# Patient Record
Sex: MALE | Race: WHITE
[De-identification: names, ages, dates, MRNs, and addresses within clinical notes are randomized per-mention and may not be internally consistent; named-entity substitution may affect disease eponyms.]

---

## 2018-04-29 ENCOUNTER — HOSPITAL ENCOUNTER (OUTPATIENT)
Dept: HOSPITAL 97 - ER | Age: 78
Setting detail: OBSERVATION
LOS: 1 days | Discharge: HOME | End: 2018-04-30
Attending: FAMILY MEDICINE | Admitting: FAMILY MEDICINE
Payer: COMMERCIAL

## 2018-04-29 VITALS — BODY MASS INDEX: 29.3 KG/M2

## 2018-04-29 DIAGNOSIS — Z86.73: ICD-10-CM

## 2018-04-29 DIAGNOSIS — I38: ICD-10-CM

## 2018-04-29 DIAGNOSIS — G62.9: ICD-10-CM

## 2018-04-29 DIAGNOSIS — D69.6: ICD-10-CM

## 2018-04-29 DIAGNOSIS — K92.1: ICD-10-CM

## 2018-04-29 DIAGNOSIS — G93.41: Primary | ICD-10-CM

## 2018-04-29 DIAGNOSIS — I25.10: ICD-10-CM

## 2018-04-29 DIAGNOSIS — Z95.0: ICD-10-CM

## 2018-04-29 DIAGNOSIS — R73.9: ICD-10-CM

## 2018-04-29 DIAGNOSIS — E78.5: ICD-10-CM

## 2018-04-29 DIAGNOSIS — I10: ICD-10-CM

## 2018-04-29 DIAGNOSIS — I49.5: ICD-10-CM

## 2018-04-29 LAB
ALBUMIN SERPL BCP-MCNC: 4.2 G/DL (ref 3.2–5.5)
ALP SERPL-CCNC: 37 IU/L (ref 42–121)
ALT SERPL W P-5'-P-CCNC: 24 IU/L (ref 10–60)
AST SERPL W P-5'-P-CCNC: 30 IU/L (ref 10–42)
BUN BLD-MCNC: 22 MG/DL (ref 6–20)
GLUCOSE SERPLBLD-MCNC: 140 MG/DL (ref 65–120)
HCT VFR BLD CALC: 41.6 % (ref 39.6–49)
INR BLD: 0.99
LIPASE SERPL-CCNC: 21 U/L (ref 22–51)
LYMPHOCYTES # SPEC AUTO: 1.1 K/UL (ref 0.7–4.9)
MAGNESIUM SERPL-MCNC: 2.1 MG/DL (ref 1.8–2.5)
MCH RBC QN AUTO: 29.8 PG (ref 27–35)
MCV RBC: 90.5 FL (ref 80–100)
METHAMPHET UR QL SCN: NEGATIVE
PMV BLD: 8.6 FL (ref 7.6–11.3)
POTASSIUM SERPL-SCNC: 4.4 MEQ/L (ref 3.6–5)
RBC # BLD: 4.6 M/UL (ref 4.33–5.43)
THC SERPL-MCNC: NEGATIVE NG/ML

## 2018-04-29 PROCEDURE — 80307 DRUG TEST PRSMV CHEM ANLYZR: CPT

## 2018-04-29 PROCEDURE — 93005 ELECTROCARDIOGRAM TRACING: CPT

## 2018-04-29 PROCEDURE — 81003 URINALYSIS AUTO W/O SCOPE: CPT

## 2018-04-29 PROCEDURE — 97163 PT EVAL HIGH COMPLEX 45 MIN: CPT

## 2018-04-29 PROCEDURE — 71045 X-RAY EXAM CHEST 1 VIEW: CPT

## 2018-04-29 PROCEDURE — 85025 COMPLETE CBC W/AUTO DIFF WBC: CPT

## 2018-04-29 PROCEDURE — 85610 PROTHROMBIN TIME: CPT

## 2018-04-29 PROCEDURE — 87804 INFLUENZA ASSAY W/OPTIC: CPT

## 2018-04-29 PROCEDURE — 36415 COLL VENOUS BLD VENIPUNCTURE: CPT

## 2018-04-29 PROCEDURE — 84484 ASSAY OF TROPONIN QUANT: CPT

## 2018-04-29 PROCEDURE — 83735 ASSAY OF MAGNESIUM: CPT

## 2018-04-29 PROCEDURE — 80048 BASIC METABOLIC PNL TOTAL CA: CPT

## 2018-04-29 PROCEDURE — 80053 COMPREHEN METABOLIC PANEL: CPT

## 2018-04-29 PROCEDURE — 80076 HEPATIC FUNCTION PANEL: CPT

## 2018-04-29 PROCEDURE — 70450 CT HEAD/BRAIN W/O DYE: CPT

## 2018-04-29 PROCEDURE — 94760 N-INVAS EAR/PLS OXIMETRY 1: CPT

## 2018-04-29 PROCEDURE — 82962 GLUCOSE BLOOD TEST: CPT

## 2018-04-29 PROCEDURE — 82746 ASSAY OF FOLIC ACID SERUM: CPT

## 2018-04-29 PROCEDURE — 82550 ASSAY OF CK (CPK): CPT

## 2018-04-29 PROCEDURE — 82140 ASSAY OF AMMONIA: CPT

## 2018-04-29 PROCEDURE — 90670 PCV13 VACCINE IM: CPT

## 2018-04-29 PROCEDURE — 99285 EMERGENCY DEPT VISIT HI MDM: CPT

## 2018-04-29 PROCEDURE — 83880 ASSAY OF NATRIURETIC PEPTIDE: CPT

## 2018-04-29 PROCEDURE — 74177 CT ABD & PELVIS W/CONTRAST: CPT

## 2018-04-29 PROCEDURE — 83690 ASSAY OF LIPASE: CPT

## 2018-04-29 PROCEDURE — 82607 VITAMIN B-12: CPT

## 2018-04-29 PROCEDURE — 86592 SYPHILIS TEST NON-TREP QUAL: CPT

## 2018-04-29 RX ADMIN — SODIUM CHLORIDE SCH MLS: 0.9 INJECTION, SOLUTION INTRAVENOUS at 22:33

## 2018-04-29 RX ADMIN — Medication SCH ML: at 22:32

## 2018-04-29 NOTE — RAD REPORT
EXAM DESCRIPTION:  CT - Head Brain Wo Cont - 4/29/2018 3:36 pm

 

CLINICAL HISTORY:  Transient alteration of awareness

 

COMPARISON:  None.

 

TECHNIQUE:  Axial 5 mm thick images of the head were obtained without IV contrast.

 

All CT scans are performed using dose optimization technique as appropriate and may include automated
 exposure control or mA/KV adjustment according to patient size.

 

FINDINGS:  No intracranial hemorrhage, mass, edema or shift of mid-line structures. No acute cortical
 based infarction. Minimal atrophy and chronic ischemic changes are noted. No abnormal extra-axial fl
uid collections. Ventricles are normal. Contrast is present in the arteries and venous sinuses from e
arlier contrast CT abdomen study.

 

Mastoid air cells and visualized portions of the paranasal sinuses are clear.

 

No acute bony findings.

 

 

IMPRESSION:  Negative non-contrast CT head examination for acute finding.  Minimal atrophy and chroni
c ischemic change noted.

## 2018-04-29 NOTE — ER
Nurse's Notes                                                                                     

 [facility]                                                                

Name: Jason Lal Sr                                                                             

Age: 77 yrs                                                                                       

Sex: Male                                                                                         

: 1940                                                                                   

MRN: [id number]                                                                                   

Arrival Date: 2018                                                                          

Time: 12:17                                                                                       

Account#: [id number]                                                                            

Bed 6                                                                                             

Private MD:                                                                                       

Diagnosis: Altered mental status, unspecified;Dehydration                                         

                                                                                                  

Presentation:                                                                                     

                                                                                             

12:18 Presenting complaint: EMS states: PT C/O abd pain, nausea, vomiting, not feeling right  la1 

      in head, BP was elevated in the 190s, no focal neurological deficits noted but pt seems     

      lethargic. Transition of care: patient was not received from another setting of care.       

      Onset of symptoms was 2018. Initial Sepsis Screen: Does the patient meet any      

      2 criteria? No. Patient's initial sepsis screen is negative. Does the patient have a        

      suspected source of infection? No. Patient's initial sepsis screen is negative. Care        

      prior to arrival: None.                                                                     

12:18 Method Of Arrival: EMS: [facility]                                                    la1 

12:18 Acuity: VASQUEZ 2                                                                           la1 

                                                                                                  

Historical:                                                                                       

- Allergies:                                                                                      

12:20 No Known Allergies;                                                                     la1 

- Home Meds:                                                                                      

17:46 metoprolol tartrate 50 mg Oral tab 1 tab once daily [Active]; aspirin 325 mg Oral TbEC  la1 

      1 tab once daily [Active]; Fish Oil 1,000 mg Oral cap 2 cap daily [Active];                 

      losartan-hydrochlorothiazide 100-12.5 mg Oral tab 1 tab once daily [Active]; Plavix 75      

      mg Oral tab 1 tab once daily [Active]; Ranexa 500 mg Oral Tb12 1 tab 2 times per day        

      [Active]; rosuvastatin 10 mg Oral tab 1 tab once daily [Active];                            

- PMHx:                                                                                           

12:20 High Cholesterol; Hypertension; Myocardial infarction;                                  la1 

                                                                                                  

- Immunization history:: Adult Immunizations up to date.                                          

- Social history:: Smoking status: unknown.                                                       

                                                                                                  

                                                                                                  

Screenin:22 Abuse screen: Denies threats or abuse. Nutritional screening: No deficits noted.        la1 

      Tuberculosis screening: No symptoms or risk factors identified. Fall Risk None              

      identified.                                                                                 

                                                                                                  

Assessment:                                                                                       

12:21 General: Appears uncomfortable, Behavior is calm, cooperative. Pain: Complains of pain  la1 

      in chest and abdomen. Neuro: Level of Consciousness is awake, alert, obeys commands,        

      Oriented to person, place, time, situation. Cardiovascular: Heart tones S1 S2 Capillary     

      refill < 3 seconds Patient's skin is warm and dry. Respiratory: Airway is patent            

      Trachea midline Respiratory effort is even, unlabored, Respiratory pattern is regular,      

      symmetrical, Breath sounds are clear bilaterally. GI: Abdomen is round obese, Bowel         

      sounds present X 4 quads. Abd is soft X 4 quads Abd is non tender X 4 quads Reports         

      nausea, vomiting. : No signs and/or symptoms were reported regarding the                  

      genitourinary system.                                                                       

13:59 Reassessment: Patient appears in no apparent distress at this time. No changes from     la1 

      previously documented assessment. Patient and/or family updated on plan of care and         

      expected duration. Pain level reassessed. Patient is alert, oriented x 3, equal             

      unlabored respirations, skin warm/dry/pink.                                                 

14:37 Reassessment: Patient and/or family updated on plan of care and expected duration. Pain hj  

      level reassessed. Patient is alert, oriented x 3, equal unlabored respirations, skin        

      warm/dry/pink.                                                                              

15:14 Reassessment: Patient appears in no apparent distress at this time. No changes from     la1 

      previously documented assessment. Patient and/or family updated on plan of care and         

      expected duration. Pain level reassessed. Patient is alert, oriented x 3, equal             

      unlabored respirations, skin warm/dry/pink.                                                 

16:05 Reassessment: Patient appears in no apparent distress at this time. No changes from     la1 

      previously documented assessment. Patient and/or family updated on plan of care and         

      expected duration. Pain level reassessed. Patient is alert, oriented x 3, equal             

      unlabored respirations, skin warm/dry/pink.                                                 

16:37 Reassessment: Patient appears in no apparent distress at this time. No changes from     la1 

      previously documented assessment. Patient and/or family updated on plan of care and         

      expected duration. Pain level reassessed. Patient is alert, oriented x 3, equal             

      unlabored respirations, skin warm/dry/pink.                                                 

17:47 Reassessment: Patient appears in no apparent distress at this time. No changes from     la1 

      previously documented assessment. Patient and/or family updated on plan of care and         

      expected duration. Pain level reassessed. Patient is alert, oriented x 3, equal             

      unlabored respirations, skin warm/dry/pink. Awaiting room assignment VSS, all ED orders     

      complete.                                                                                   

17:53 Reassessment: pt provided with HH diet.                                                 la1 

19:12 Reassessment: Patient appears in no apparent distress at this time. Neuro: Level of     lp1 

      Consciousness is awake, alert, obeys commands, Oriented to person, place, situation.        

      Respiratory: Respiratory effort is even, unlabored. Derm: Skin is intact, Skin is dry,      

      Skin is pale.                                                                               

                                                                                                  

Vital Signs:                                                                                      

12:23  / 77; Pulse 63; Resp 25; Temp 98.4(TE); Pulse Ox 96% on R/A;                     la1 

13:08  / 69; Pulse 66; Resp 19; Pulse Ox 99% on R/A;                                    la1 

14:37  / 68; Pulse 70; Resp 18; Pulse Ox 95% on R/A;                                    hj  

16:04  / 73; Pulse 65; Resp 19; Pulse Ox 100% on R/A;                                   la1 

17:46  / 70; Pulse 65; Resp 16; Pulse Ox 100% on R/A;                                   la1 

18:18  / 73; Pulse 71; Resp 16; Pulse Ox 98% on R/A;                                    la1 

18:19 Weight 90.72 kg (R);                                                                    la1 

19:11  / 61; Pulse 68; Resp 20; Temp 98.0(O); Pulse Ox 94% on R/A;                      lp1 

                                                                                                  

NIH Stroke Scale Scores:                                                                          

12:41 NIHSS Score: 0                                                                          [facility] 

                                                                                                  

ED Course:                                                                                        

12:17 Patient arrived in ED.                                                                  la1 

12:19 Triage completed.                                                                       la1 

12:20 Joshua Obregon, RN is Primary Nurse.                                                       la1 

12:20 Arm band placed on right wrist. EKG completed in triage. Results shown to MD.           la1 

12:23 Javi Morton PA is PHCP.                                                               jr8 

12:23 Chapin Samayoa MD is Attending Physician.                                              jr8 

12:23 Placed in gown. Bed in low position. Call light in reach. Cardiac monitor on. Pulse ox  la1 

      on. NIBP on.                                                                                

13:13 X-ray completed. Portable x-ray completed in exam room.                                 la2 

13:14 XRAY Chest (1 view) In Process Unspecified.                                             EDMS

13:58 CT completed. Patient moved to CT via stretcher. Patient moved back from CT.            ap2 

14:00 CT Abd/Pelvis - W/Contrast In Process Unspecified.                                      EDMS

15:36 CT Head Brain wo Cont In Process Unspecified.                                           EDMS

15:36 CT completed. Patient moved to CT via stretcher. Patient moved back from CT.            bq  

16:38 Inserted saline lock: 22 gauge in right forearm, using aseptic technique. Blood         la1 

      collected.                                                                                  

17:08 Teena Keller MD is Hospitalizing Provider.                                            jr8 

17:14 AMMONIA Sent.                                                                           la1 

17:21 UDS Sent.                                                                               la1 

19:11 No provider procedures requiring assistance completed. Patient admitted, IV remains in  lp1 

      place.                                                                                      

                                                                                                  

Administered Medications:                                                                         

No medications were administered                                                                  

                                                                                                  

                                                                                                  

Point of Care Testing:                                                                            

      Blood Glucose:                                                                              

12:55 Blood Glucose: 128 mg/dL;                                                               la1 

      Ranges:                                                                                     

                                                                                                  

Outcome:                                                                                          

17:09 Decision to Hospitalize by Provider.                                                    jr8 

19:11 Condition: stable                                                                       lp1 

19:11 Instructed on the need for admit, to patient and family at bedside                          

19:39 Admitted to Med/surg accompanied by nurse, via stretcher, room 211, with chart, Report  lp1 

      called to  SUHAIL Zarate                                                                        

19:49 Patient left the ED.                                                                    bb  

                                                                                                  

                                                                                                  

NIH Stroke Scale - NIH Stroke Score                                                               

Date: 2018                                                                                  

Time: 12:41                                                                                       

Total Score = 0                                                                                   

  1a. Level of Consciousness (LOC) - 0(Alert)                                                     

  1b. Level of Consciousness (LOC) (Year \T\ Age) - 0(Both)                                       

  1c. LOC Commands (Open \T\ Closes Eyes/) - 0(Both)                                          

   2. Best Gaze (Lateral Gaze Paresis) - 0(Normal)                                                

   3. Visual Field Loss - 0(No visual loss)                                                       

   4. Facial Palsy - 0(Normal)                                                                    

  5a. Left Arm: Motor (10-second hold) - 0(No drift)                                              

  5b. Right Arm: Motor (10-second hold) - 0(No drift)                                             

  6a. Left Leg: Motor (5-second hold - always test supine) - 0(No drift)                          

  6b. Right Leg: Motor (5-second hold - always test supine) - 0(No drift)                         

   7. Limb Ataxia (finger/nose \T\ heel/shin - test with eyes open) - 0(Absent)                   

   8. Sensory Loss (pinprick arms/legs/face) - 0(Normal)                                          

   9. Best Language: Aphasia (description/naming/reading) - 0(No aphasia)                         

  10. Dysarthria (speech clarity - read or repeat words) - 0(Normal)                              

  11. Extinction and Inattention (visual/tactile/auditory/spatial/personal) - 0(No                

      abnormality)                                                                                

Initials: jr8                                                                                     

                                                                                                  

Signatures:                                                                                       

Dispatcher [employer]                           Meron Roach Brenda RN                     RN   Kari Villarreal RN                         RN   lp1                                                  

Javi Morton PA PA   jr8                                                  

Joshua Obregon RN                         RN   la1                                                  

Paresh Greene RN                      RN   Nora Meza                               la2                                                  

Daina Pritchett                                                  

                                                                                                  

Corrections: (The following items were deleted from the chart)                                    

20:10 19:39 Admitted to Med/surg accompanied by nurse, via wheelchair, room 211, with lp1         

      chart, Report called to SUHAIL Zarate lp1                                                       

                                                                                                  

**************************************************************************************************

## 2018-04-29 NOTE — EDPHYS
Physician Documentation                                                                           

 [facility]                                                                

Name: Jason Lal Sr                                                                             

Age: 77 yrs                                                                                       

Sex: Male                                                                                         

: 1940                                                                                   

MRN: [id number]                                                                                   

Arrival Date: 2018                                                                          

Time: 12:17                                                                                       

Account#: [id number]                                                                            

Bed 6                                                                                             

Private MD:                                                                                       

ED Physician Chapin Samayoa                                                                       

HPI:                                                                                              

                                                                                             

12:41 This 77 yrs old  Male presents to ER via EMS with complaints of Abdominal      jr8 

      Pain, Nausea/Vomiting, Fatigue.                                                             

12:41 The patient presents with abdominal pain that is diffuse. Onset: The symptoms/episode   jr8 

      began/occurred acutely, today. The symptoms do not radiate. Associated signs and            

      symptoms: Pertinent positives: nausea and vomiting. The symptoms are described as           

      constant, dull. Modifying factors: The symptoms are alleviated by nothing, the symptoms     

      are aggravated by nothing. Severity of pain: At its worst the pain was moderate in the      

      emergency department the pain is unchanged. The patient has not experienced similar         

      symptoms in the past. The patient has not recently seen a physician. Patient stated         

      that he feels very weak and shaky. Has had abdominal pain with nausea on/off today.         

      Called family earlier because he was not feeling well. Family stated that he seems          

      altered. Slow to respond. Not acting appropriate. Usually very alert and responsive.        

      Unknown onset .                                                                             

                                                                                                  

Historical:                                                                                       

- Allergies:                                                                                      

12:20 No Known Allergies;                                                                     la1 

- Home Meds:                                                                                      

17:46 metoprolol tartrate 50 mg Oral tab 1 tab once daily [Active]; aspirin 325 mg Oral TbEC  la1 

      1 tab once daily [Active]; Fish Oil 1,000 mg Oral cap 2 cap daily [Active];                 

      losartan-hydrochlorothiazide 100-12.5 mg Oral tab 1 tab once daily [Active]; Plavix 75      

      mg Oral tab 1 tab once daily [Active]; Ranexa 500 mg Oral Tb12 1 tab 2 times per day        

      [Active]; rosuvastatin 10 mg Oral tab 1 tab once daily [Active];                            

- PMHx:                                                                                           

12:20 High Cholesterol; Hypertension; Myocardial infarction;                                  la1 

                                                                                                  

- Immunization history:: Adult Immunizations up to date.                                          

- Social history:: Smoking status: unknown.                                                       

                                                                                                  

                                                                                                  

ROS:                                                                                              

12:41 Eyes: Negative for injury, pain, redness, and discharge, ENT: Negative for injury,      jr8 

      pain, and discharge, Neck: Negative for injury, pain, and swelling, Cardiovascular:         

      Negative for chest pain, palpitations, and edema, Respiratory: Negative for shortness       

      of breath, cough, wheezing, and pleuritic chest pain, Back: Negative for injury and         

      pain, MS/Extremity: Negative for injury and deformity, Skin: Negative for injury, rash,     

      and discoloration, Neuro: Negative for headache, weakness, numbness, tingling, and          

      seizure. Positive for AMS                                                                   

12:41 Constitutional: Positive for fatigue, malaise.                                              

12:41 Abdomen/GI: Positive for abdominal pain, nausea and vomiting, Negative for diarrhea,        

      constipation, anorexia, dysphagia, hematemesis, black/tarry stool, rectal pain, rectal      

      bleeding, bowel incontinence, flatulence.                                                   

                                                                                                  

Exam:                                                                                             

12:41 Eyes:  Pupils equal round and reactive to light, extra-ocular motions intact.  Lids and jr8 

      lashes normal.  Conjunctiva and sclera are non-icteric and not injected.  Cornea within     

      normal limits.  Periorbital areas with no swelling, redness, or edema. ENT:  Nares          

      patent. No nasal discharge, no septal abnormalities noted.  Tympanic membranes are          

      normal and external auditory canals are clear.  Oropharynx with no redness, swelling,       

      or masses, exudates, or evidence of obstruction, uvula midline.  Mucous membranes           

      moist. Neck:  Trachea midline, no thyromegaly or masses palpated, and no cervical           

      lymphadenopathy.  Supple, full range of motion without nuchal rigidity, or vertebral        

      point tenderness.  No Meningismus. Cardiovascular:  Regular rate and rhythm with a          

      normal S1 and S2.  No gallops, murmurs, or rubs.  Normal PMI, no JVD.  No pulse             

      deficits. Respiratory:  Lungs have equal breath sounds bilaterally, clear to                

      auscultation and percussion.  No rales, rhonchi or wheezes noted.  No increased work of     

      breathing, no retractions or nasal flaring. Back:  No spinal tenderness.  No                

      costovertebral tenderness.  Full range of motion. Skin:  Warm, dry with normal turgor.      

      Normal color with no rashes, no lesions, and no evidence of cellulitis. MS/ Extremity:      

      Pulses equal, no cyanosis.  Neurovascular intact.  Full, normal range of motion.            

12:41 Abdomen/GI: Inspection: distension, that is mild, Bowel sounds: active, all quadrants,      

      Palpation: soft, in all quadrants, mild abdominal tenderness, in the abdomen diffusely,     

      mass, is not appreciated, rebound tenderness, is not appreciated, voluntary guarding,       

      is not appreciated, involuntary guarding, is not appreciated, no appreciated                

      organomegaly, Indicators: McBurney's point is not tender, Centeno's sign is negative,        

      Rovsing's sign is negative, Liver: tenderness, is not appreciated.                          

12:41 Neuro: Orientation: to person, place, Mentation: able to follow commands, slow to       jr8 

      respond, sleepy, Memory: immediate memory  is intact, remote memory is intact. recent       

      memory  is intact, Cranial nerves: CN I not tested, CN II- XII are normal as tested,        

      extraocular movements are intact, Facial palsy and sensory deficits are absent.             

      Nystagmus is absent. Speech is slowed, Tongue strength is normal, Cerebellar function:      

      normal finger to nose testing, heel to shin testing is normal, Motor: moves all fours,      

      strength is 5/5 in all extremities, Sensation: no obvious gross deficits, Gait: not         

      tested. seizure activity, is not displayed by the patient, Abnormal movements: resting      

      tremor, is located in the  right arm and left arm.                                          

                                                                                                  

Vital Signs:                                                                                      

12:23  / 77; Pulse 63; Resp 25; Temp 98.4(TE); Pulse Ox 96% on R/A;                     la1 

13:08  / 69; Pulse 66; Resp 19; Pulse Ox 99% on R/A;                                    la1 

14:37  / 68; Pulse 70; Resp 18; Pulse Ox 95% on R/A;                                    hj  

16:04  / 73; Pulse 65; Resp 19; Pulse Ox 100% on R/A;                                   la1 

17:46  / 70; Pulse 65; Resp 16; Pulse Ox 100% on R/A;                                   la1 

18:18  / 73; Pulse 71; Resp 16; Pulse Ox 98% on R/A;                                    la1 

18:19 Weight 90.72 kg (R);                                                                    la1 

19:11  / 61; Pulse 68; Resp 20; Temp 98.0(O); Pulse Ox 94% on R/A;                      lp1 

                                                                                                  

NIH Stroke Scale Scores:                                                                          

12:41 NIHSS Score: 0                                                                          jr8 

                                                                                                  

MDM:                                                                                              

12:23 Patient medically screened.                                                             jr8 

15:16 ED course: Patient after being reevaluated is now more responsive. Does not remember a  jr8 

      lot of what we talked about earlier.                                                        

17:08 Data reviewed: vital signs, nurses notes, lab test result(s), EKG, radiologic studies,  [facility] 

      CT scan, ultrasound, and as a result, I will admit patient. Data interpreted: Pulse         

      oximetry: on room air is 100 %. Interpretation: normal. Counseling: I had a detailed        

      discussion with the patient and/or guardian regarding: the historical points, exam          

      findings, and any diagnostic results supporting the discharge/admit diagnosis, lab          

      results, radiology results, the need for further work-up and treatment in the hospital.     

      Physician consultation: Teena Keller MD was called at 17:08, was contacted at 17:08,        

      regarding admission, to the telemetry unit. consult, patient's condition, and will see      

      patient in ED.                                                                              

                                                                                                  

                                                                                             

12:24 Order name: Basic Metabolic Panel; Complete Time: 13:                                                                                             

12:24 Order name: BNP; Complete Time: 14:20                                                                                                                                                

12:24 Order name: CBC with Diff; Complete Time: 13:49                                                                                                                                      

12:24 Order name: CPK; Complete Time: 13:                                                                                             

12:24 Order name: LFT's; Complete Time: 13:                                                                                             

12:24 Order name: Magnesium; Complete Time: 13:                                                                                             

12:24 Order name: PT-INR; Complete Time: 13:09                                                                                                                                             

12:24 Order name: Troponin (emerg Dept Use Only); Complete Time: 13:49                                                                                                                     

12:24 Order name: XRAY Chest (1 view); Complete Time: 16:02                                                                                                                                

12:24 Order name: Flu; Complete Time: 13:                                                                                             

12:24 Order name: Lipase; Complete Time: 13:                                                                                             

15:38 Order name: Urine Dipstick--Ancillary (enter results); Complete Time: 06:22             eb  

                                                                                             

17:05 Order name: AMMONIA; Complete Time: 17:58                                               la1 

                                                                                             

17:09 Order name: UDS; Complete Time: 17:35                                                                                                                                                

12:24 Order name: EKG; Complete Time: 12:24                                                                                                                                                

12:24 Order name: Cardiac monitoring; Complete Time: 12:31                                                                                                                                 

12:24 Order name: EKG - Nurse/Tech; Complete Time: 12:31                                                                                                                                   

12:24 Order name: IV Saline Lock; Complete Time: 12:48                                                                                                                                     

12:24 Order name: Labs collected and sent; Complete Time: 12:48                                                                                                                            

12:24 Order name: O2 Per Protocol; Complete Time: 12:                                                                                             

12:24 Order name: O2 Sat Monitoring; Complete Time: 12:                                                                                             

12:24 Order name: Urine Dipstick-Ancillary (obtain specimen); Complete Time: 17:04                                                                                                         

13:22 Order name: CT Abd/Pelvis - W/Contrast; Complete Time: 14:30                                                                                                                         

15:12 Order name: CT Head Brain wo Cont; Complete Time: 16:02                                                                                                                              

17:15 Order name: Heart Healthy                                                               EDMS

                                                                                             

17:15 Order name: Physical Therapy Consult                                                    EDMS

                                                                                                  

Administered Medications:                                                                         

No medications were administered                                                                  

                                                                                                  

                                                                                                  

Point of Care Testing:                                                                            

      Blood Glucose:                                                                              

12:55 Blood Glucose: 128 mg/dL;                                                               la1 

      Ranges:                                                                                     

      Critical Glucose Levels:Adult <50 mg/dl or >400 mg/dl  <40 mg/dl or >180 mg/dl       

Disposition:                                                                                      

                                                                                             

12:46 Co-signature as Attending Physician, Chapin Samayoa MD.                                    

                                                                                                  

Disposition:                                                                                      

18 17:09 Hospitalization ordered by Teena Keller for Observation. Preliminary diagnosis    

  are Altered mental status, unspecified, Dehydration.                                            

- Bed requested for Telemetry/MedSurg (observation).                                              

- Status is Observation.                                                                      bb  

- Condition is Stable.                                                                            

- Problem is new.                                                                                 

- Symptoms have improved.                                                                         

UTI on Admission? No                                                                              

                                                                                                  

                                                                                                  

                                                                                                  

                                                                                                  

NIH Stroke Scale - NIH Stroke Score                                                               

Date: 2018                                                                                  

Time: 12:41                                                                                       

Total Score = 0                                                                                   

  1a. Level of Consciousness (LOC) - 0(Alert)                                                     

  1b. Level of Consciousness (LOC) (Year \T\ Age) - 0(Both)                                       

  1c. LOC Commands (Open \T\ Closes Eyes/) - 0(Both)                                          

   2. Best Gaze (Lateral Gaze Paresis) - 0(Normal)                                                

   3. Visual Field Loss - 0(No visual loss)                                                       

   4. Facial Palsy - 0(Normal)                                                                    

  5a. Left Arm: Motor (10-second hold) - 0(No drift)                                              

  5b. Right Arm: Motor (10-second hold) - 0(No drift)                                             

  6a. Left Leg: Motor (5-second hold - always test supine) - 0(No drift)                          

  6b. Right Leg: Motor (5-second hold - always test supine) - 0(No drift)                         

   7. Limb Ataxia (finger/nose \T\ heel/shin - test with eyes open) - 0(Absent)                   

   8. Sensory Loss (pinprick arms/legs/face) - 0(Normal)                                          

   9. Best Language: Aphasia (description/naming/reading) - 0(No aphasia)                         

  10. Dysarthria (speech clarity - read or repeat words) - 0(Normal)                              

  11. Extinction and Inattention (visual/tactile/auditory/spatial/personal) - 0(No                

      abnormality)                                                                                

Initials: jr8                                                                                     

                                                                                                  

Signatures:                                                                                       

Dispatcher [employer]                           EDRamya Salinas RN RN dw Ballard, Brenda, RN RN bb Roszak, Josh, PA PA   jr8                                                  

Joshua Obregon RN RN   laChapin Garnica MD MD gs Botello, Elizabeth eb                                                   

                                                                                                  

Corrections: (The following items were deleted from the chart)                                    

                                                                                             

15:13 12:41 Patient stated that he feels very weak and shaky. Has had abdominal pain  jr8         

      with nausea on/off today. Called family earlier because he was not feeling well             

      . jr8                                                                                       

15:13 12:41 Eyes: Negative for injury, pain, redness, and discharge, ENT: Negative    jr8         

      for injury, pain, and discharge, Neck: Negative for injury, pain, and swelling,             

      Cardiovascular: Negative for chest pain, palpitations, and edema, Respiratory:              

      Negative for shortness of breath, cough, wheezing, and pleuritic chest pain,                

      Back: Negative for injury and pain, MS/Extremity: Negative for injury and                   

      deformity, Skin: Negative for injury, rash, and discoloration, Neuro: Negative              

      for headache, weakness, numbness, tingling, and seizure, jr8                                

15:15 12:41 Eyes: Pupils equal round and reactive to light, extra-ocular motions      jr8         

      intact. Lids and lashes normal. Conjunctiva and sclera are non-icteric and not              

      injected. Cornea within normal limits. Periorbital areas with no swelling,                  

      redness, or edema. ENT: Nares patent. No nasal discharge, no septal                         

      abnormalities noted. Tympanic membranes are normal and external auditory canals             

      are clear. Oropharynx with no redness, swelling, or masses, exudates, or                    

      evidence of obstruction, uvula midline. Mucous membranes moist. Neck: Trachea               

      midline, no thyromegaly or masses palpated, and no cervical lymphadenopathy.                

      Supple, full range of motion without nuchal rigidity, or vertebral point                    

      tenderness. No Meningismus. Cardiovascular: Regular rate and rhythm with a                  

      normal S1 and S2. No gallops, murmurs, or rubs. Normal PMI, no JVD. No pulse                

      deficits. Respiratory: Lungs have equal breath sounds bilaterally, clear to                 

      auscultation and percussion. No rales, rhonchi or wheezes noted. No increased               

      work of breathing, no retractions or nasal flaring. Back: No spinal tenderness.             

      No costovertebral tenderness. Full range of motion. Skin: Warm, dry with normal             

      turgor. Normal color with no rashes, no lesions, and no evidence of cellulitis.             

      MS/ Extremity: Pulses equal, no cyanosis. Neurovascular intact. Full, normal                

      range of motion. Neuro: Awake and alert, GCS 15, oriented to person, place,                 

      time, and situation. Cranial nerves II-XII grossly intact. Motor strength 5/5               

      in all extremities. Sensory grossly intact. Cerebellar exam normal. Normal                  

      gait. [id number]                                                                                   

                                                                                                  

**************************************************************************************************

## 2018-04-29 NOTE — RAD REPORT
EXAM DESCRIPTION:  RAD - Chest Single View - 4/29/2018 1:16 pm

 

CLINICAL HISTORY:  Abdominal pain, nausea and vomiting

 

Transcription malfunction precluded earlier written report

 

COMPARISON:  July 2017

 

TECHNIQUE:  AP portable chest image was obtained 1309 hours .

 

FINDINGS:  No peripheral mass or consolidation. Lung markings are minimally increased over the prior 
study. This could be a minimal edema, infiltrate or progressive fibrotic process. Heart and vasculatu
re are normal. No measurable pleural effusion and no pneumothorax. No gross bony abnormality seen. No
 acute aortic findings suspected.

 

IMPRESSION:  No mass, consolidation or significant failure.

 

Lung markings are slightly increased over the comparison. This could be infiltrate, edema or less lik
ely progressive fibrosis.

## 2018-04-29 NOTE — RAD REPORT
EXAM DESCRIPTION:  CT - Abdomen   Pelvis W Contrast - 4/29/2018 2:00 pm

 

CLINICAL HISTORY:  Abdominal pain, nausea and vomiting

 

COMPARISON:  None.

 

TECHNIQUE:  Biphasic, helical CT imaging of the abdomen and pelvis was performed following 100 ml non
-ionic IV contrast. Oral contrast was given.

 

All CT scans are performed using dose optimization technique as appropriate and may include automated
 exposure control or mA/KV adjustment according to patient size.

 

FINDINGS:  No mass or infiltrate in the lung bases. No pericardial effusion. There is a trace amount 
of pleural thickening and parenchymal scarring.

 

Liver and spleen show no suspicious parenchymal lesions. Numerous benign granulomatous type calcifica
tions are present. No pancreatic mass or peripancreatic acute finding seen. Biliary tree is mildly pr
ominent but not clearly outside of normal range. No pancreatic duct dilatation. Gallbladder is unrema
rkable though gallstones can be occult.

 

No pyelonephritis or acute renal parenchymal process. No adrenal suspicious finding. Partially filled
 urinary bladder shows no suspicious findings. Prostate gland and seminal vesicles are within normal 
limits. There are prostate calcifications and numerous pelvic floor phleboliths.

 

No gastric dilatation or gastric wall thickening. No dilated large or small bowel. No appendicitis. M
ild to moderate stool volume throughout the colon without active process seen.  No free air, free flu
id or inflammatory stranding.  No mass or bulky lymphadenopathy. Fat extends into the origin of the l
eft inguinal canal. Hydrocele is present on the right only partially imaged. A very small umbilical h
ernia is present of no consequence. Delete select

 

Prominent disc and bony degenerative changes are present. An acute or pathologic bone process not may
ntified. Vascular calcifications present without an acute vascular finding identifiable.

 

 

IMPRESSION:  No bowel obstruction, free air or other surgically emergent finding.

 

Mild prominence of the biliary tree with unremarkable gallbladder. Gallstones can be occult. Biliary 
obstruction is doubtful without clinical or laboratory confirmation.

 

No acute GI process.

## 2018-04-30 VITALS — DIASTOLIC BLOOD PRESSURE: 73 MMHG | SYSTOLIC BLOOD PRESSURE: 174 MMHG | TEMPERATURE: 98.7 F

## 2018-04-30 VITALS — OXYGEN SATURATION: 93 %

## 2018-04-30 LAB
ALBUMIN SERPL BCP-MCNC: 4.2 G/DL (ref 3.2–5.5)
ALP SERPL-CCNC: 37 IU/L (ref 42–121)
ALT SERPL W P-5'-P-CCNC: 21 IU/L (ref 10–60)
AST SERPL W P-5'-P-CCNC: 26 IU/L (ref 10–42)
BUN BLD-MCNC: 19 MG/DL (ref 6–20)
GLUCOSE SERPLBLD-MCNC: 103 MG/DL (ref 65–120)
HCT VFR BLD CALC: 40.9 % (ref 39.6–49)
LYMPHOCYTES # SPEC AUTO: 1.5 K/UL (ref 0.7–4.9)
MCH RBC QN AUTO: 30.6 PG (ref 27–35)
MCV RBC: 90.7 FL (ref 80–100)
PMV BLD: 8.4 FL (ref 7.6–11.3)
POTASSIUM SERPL-SCNC: 4.2 MEQ/L (ref 3.6–5)
RBC # BLD: 4.51 M/UL (ref 4.33–5.43)
RPR SER QL: (no result)

## 2018-04-30 RX ADMIN — Medication SCH ML: at 09:13

## 2018-04-30 RX ADMIN — SODIUM CHLORIDE SCH MLS: 0.9 INJECTION, SOLUTION INTRAVENOUS at 05:06

## 2018-04-30 RX ADMIN — SODIUM CHLORIDE SCH MLS: 0.9 INJECTION, SOLUTION INTRAVENOUS at 14:42

## 2018-04-30 NOTE — CON
Time:  1510.



Reason:  Altered mental status.



History:  A 77-year-old gentleman with multiple medical problems, sick sinus syndrome, pacemaker plac
ement, valvular heart disease, hypertension, hyperlipidemia, coronary disease.  He was in his usual s
corona of health until yesterday.  He woke.  He was fine and then about 11 a.m., developed general sens
ation of not feeling well with associated confusion and shaking to his extremities.  Primarily the up
per extremities were shaky and seems ataxic with prominent gait difficulties.  EMS was summoned.  He 
was brought to the emergency department, where evaluation there was largely unrevealing other than th
e confusion.  CT scan of the brain, atrophy, chronic ischemic change, standard labs normal.  He was a
dmitted for observation and symptom has essentially resolved overnight.  Consultation was requested.



Past Medical History:  As alluded to.



Medications:  Losartan, HCTZ, fish oil, Plavix, aspirin, metoprolol, Ranexa, and Crestor 10.



Social History:  Lives alone.  No longer smokes.  Normally independent activities daily living.



Family History:  Significant for DVT in his daughter and cerebral hemorrhage in his father.



Review of Systems:

He had a slight headache with this event as well. 

Eyes:  Denies. 

Ears, Nose, Throat:  No dysarthria. Cardiovascular:  Hypertension, sick sinus syndrome. 

Pulmonary:  Negative. 

GI:  Negative. 

:  Negative. 

Musculoskeletal:  Negative. 

Neurologic:  As noted. 

Psychiatric:  Negative. 

Endocrine:  Negative. 

Hematologic:  Negative.



Physical Examination:

Vital Signs:  Temperature 97.8, heart rate 65, respirations 18, blood pressure 174/73. 

General:  He is a pleasant gentleman, lying in bed, in no distress. 

Heart:  Sinus rhythm. 

Lungs:  Clear. 

Abdomen:  Soft.  Bowel sounds present. 

Neurologic:  He is awake, alert, oriented to time, person, place, and situation.  Pupils reactive.  O
cular motion full.  Fields full.  Facial strength sensation normal.  Tongue protrudes evenly.  Soft p
alate elevates symmetrically bilaterally.  Extremity strength full.  Sensation intact.  Reflexes 1/4 
toes are downgoing.  No finger-nose-finger ataxia.



Pertinent Laboratory Data:  CT as noted.  EKG was sinus rhythm.  Urine drug screen negative.  Chest x
-ray, no acute changes.  White count 7.2, platelets 130.  Hemoglobin and hematocrit normal.  PT/PTT n
ormal. Electrolytes normal.  Ammonia 18.  B12, folate normal.  UA normal.  RPR pending but titer not 
done suggesting RPR is negative as well.



Impression:  Probable limb shaking transient ischemic attack.



Plan:  I would increase the Crestor to 20.  Otherwise he stable to be discharged.  He is going to sta
y with his daughter for a few days.  He could have outpatient EEG to complete the evaluation. 



Thank you for the consult.





HAI

DD:  04/30/2018 15:48:30Voice ID:  [id number]

DT:  04/30/2018 21:01:10Report ID:  [id number]

## 2018-04-30 NOTE — EKG
Test Date:    2018-04-29               Test Time:    12:17:37

Technician:   LINDA                                    

                                                     

MEASUREMENT RESULTS:                                       

Intervals:                                           

Rate:         63                                     

ND:           194                                    

QRSD:         108                                    

QT:           464                                    

QTc:          474                                    

Axis:                                                

P:            78                                     

ND:           194                                    

QRS:          -11                                    

T:            7                                      

                                                     

INTERPRETIVE STATEMENTS:                                       

                                                     

Normal sinus rhythm

Normal ECG

Compared to ECG 07/18/2017 06:52:45

Ventricular premature complex(es) no longer present

ST (T wave) deviation no longer present



Electronically Signed On 04-30-18 07:17:26 CDT by Reinaldo Bradford

## 2018-04-30 NOTE — HP
Date of Admission:  04/29/2018



Chief Complaint:  Altered mental status.



Code Status:  Full.



History Of Present Illness:  The patient is a 77-year-old male with past 
medical history of sick sinus syndrome, status post pacemakers, coronary artery 
disease, hypertension, dyslipidemia, valvular heart disease, cerebrovascular 
disease, who was in his usual state of health until day of admission when the 
patient was found to be confused.  The patient states that he was watching TV 
and felt unusual.  He was unable to describe his symptoms but states that he 
was mildly confused.  He was having some chills recently and also had a some 
blood in his stool.  The patient denies any fevers.  No nausea, vomiting, chest 
pain, or shortness of breath.  The patient was unable to get up and felt 
lethargic.  He contacted his daughter and EMS was called.  The patient 
otherwise denies any syncopal episode or fall or any loss of consciousness.  
His symptoms are constant, moderate, progressively worsening.  No alleviating 
or aggravating factors.  In the ER, his vital signs were stable.  He was 
afebrile.  However, he was very much confused, unable to give much of a 
history.  The patient's workup was essentially negative.  The patient was then 
referred for admission for altered mental status.  CT scan of the head was done
, which did not show any acute changes.  CT scan of the abdomen was also done, 
which did not show any acute changes either.  It did show some mild prominence 
of the biliary tree with unremarkable gallbladder.  When the patient was seen 
in the ER, he was awake, alert, oriented x3, in some mild distress.



Past Medical History:  Sick sinus syndrome, status post pacemaker, 
cerebrovascular disease, valvular heart disease, hypertension, dyslipidemia, 
coronary artery disease.



Allergies:  NO KNOWN DRUG ALLERGIES.



Medications:  Reviewed.



Family History:  Diabetes.



Social History:  The patient lives by himself.  Denies any tobacco use.  The 
patient was a heavy alcohol drinker for many years.  Quit several years ago.  
The patient is able to take care of his activities of daily living.  Still 
drives.  The patient has good social support.  Has one daughter.



Review of Systems:

Ten point system reviewed, negative except as per HPI.



Physical Examination:

Vital Signs:  Stable, afebrile. 

General:  Awake, alert, oriented x3, elderly male, ill-appearing, lethargic, 
obese. 

HEENT:  Normocephalic, atraumatic.  PERRLA.  EOMI.  Dry mucous membranes.  
Oropharynx is clear.  Conjunctiva is anicteric.  Poor dentition. 

Neck:  Supple.  No JVD.  Trachea midline. 

CV:  S1, S2.  Regular rate and rhythm.  Peripheral pulses are weak bilaterally. 

Respiratory:  Moving air well bilaterally.  Some mild wheezing is heard.  No 
stridor or use of accessory muscles. 

Gastrointestinal:  Abdomen is soft, nondistended, nontender.  Positive bowel 
sounds.  No guarding or rigidity.  No hepatomegaly appreciated. 

Extremities:  No clubbing, cyanosis, or edema.  No calf tenderness. 

Skin:  The patient has very dry excoriated skin on the lower extremities.  No 
rashes.  Normal skin turgor. 

Neurologic:  Cranial nerves 2 through 12 intact grossly.  No focal neurological 
deficits.  Strength is 5/5 in bilateral upper and lower extremities.  Sensation 
is decreased to light touch in the lower extremities.  Rapid alternating hand 
movement is abnormal.  Finger-to-nose test is delayed.  The patient does have 
asterixis.  No meningeal signs. 

Psych:  Mood is okay.  Affect is flat.  Insight and judgment are fair.



Laboratory Data:  Sodium 137, potassium 4.4, chloride 103, CO2 of 28.  BUN 22, 
creatinine 1.04, glucose 140, calcium 9.1, magnesium 2.1, total bilirubin 0.8.  
AST 30, ALT 24, alkaline phosphatase 37.  , troponin less than 0.03.  BNP 
166.  Albumin 4.2, lipase 21.  INR 0.99.  WBC 8.9, H and H 13.7 and 41.6, 
platelets 130, neutrophils 79%.  UA pending.



Imaging Studies:  Abdominal CT pelvis with contrast shows no bowel obstruction, 
free air, or other surgically emergent finding.  Mild prominence of the biliary 
tree with unremarkable gallbladder.  Gallstones can be occult.  Biliary 
obstruction is doubtful without clinical or laboratory confirmation.  No acute 
GI process.  Head CT scan shows negative noncontrast CT head examination for 
acute finding.  Minimal atrophy and chronic ischemic change noted.  Chest x-ray 
shows no mass, consolidation, or significant failure, lung markings slightly 
increased over the comparison, could be infiltrate, edema, or less likely 
progressive fibrosis.



Assessment And Plan:  A 77-year-old male with;

1.   Acute metabolic encephalopathy, unclear etiology.  We will follow up on 
urinalysis.  No acute signs of infection.  The patient does have a long history 
of alcohol abuse and may have some underlying liver disease.  CT scan of the 
abdomen does show numerous benign granulomatous type calcifications.  We will 
obtain ammonia level.  Check RPR, vitamin B12, and folate levels.

2.   Neuropathy.

3.   Coronary artery disease.  Native artery and native heart without angina.

4.   Essential hypertension, stable.

5.   Hyperlipidemia, stable.

6.   Gastrointestinal bleed.  The patient does report some blood with his stool 
several days ago.  We will obtain stool occult blood and monitor H and H.  
currently, the patient is not anemic.

7.   Thrombocytopenia.  We will monitor platelets.

8.   Hyperglycemia, likely acute phase reactant.  We will monitor.

9.   Sick sinus syndrome, status post pacemaker.

10.   Valvular heart disease.



Plan:  GI and DVT prophylaxis with PPI.  No Lovenox due to GI bleed.  We will 
place SCDs.  We will check ammonia level and UDS.  We will check RPR.  We will 
place as observation and monitor closely.  No medical power  or living 
will





/ORI

DD:  04/29/2018 17:25:52   Voice ID:  [id number]

MTDD

## 2018-05-01 NOTE — DS
Date of Discharge:  04/30/2018



Consultant:  Dr. Kowalski with Neurology.



Admitting Diagnoses:  

1.Acute metabolic encephalopathy, unclear etiology.

2.Neuropathy.

3.Coronary artery disease, native artery and native heart without angina.

4.Essential hypertension.

5.Hyperlipidemia.

6.Gastrointestinal bleed.

7.Thrombocytopenia.

8.Hyperglycemia.

9.Sick sinus syndrome, status post pacemaker.

10.Valvular heart disease.



Discharge Diagnoses:  

1.Acute metabolic encephalopathy, resolved.

2.Neuropathy.

3.Coronary artery disease, native artery and native heart without angina, stable.

4.Essential hypertension, stable.

5.Hyperlipidemia, stable.

6.Gastrointestinal bleed.  No recurrent bleed.  H and H are stable.

7.Thrombocytopenia, stable.

8.Hyperglycemia, improved.

9.Sick sinus syndrome, status post pacemaker.

10.Valvular heart disease.



Hospital Course:  The patient is a 77-year-old male, who comes in with altered mental status.  The rosalinda higuera was worked up for altered mental status.  He had normal white blood cell count.  No source of i
nfection.  UA was negative.  Chest x-ray did not show any acute changes.  His UDS was negative as wel
l.  RPR titer is pending.  His ammonia level was also negative.  No chest pain.  Negative troponin.  
The patient was given IV fluids.  His influenza screen was also negative.  He did improve with treatm
ent.  His mental status and general condition improved.  He was no longer confused.  He was able to a
mbulate without any difficulty.  He is tolerating his diet.  The patient was also seen by Neurology, 
Dr. Kowalski.  CT scan of the brain was done, which was negative for any acute changes.  MRI of the brai
n unable to be done due to pacemaker, which is not MRI compatible.  The patient also had CT scan of t
he abdomen and pelvis done, which did not show any emergent findings, only some mild prominence of bi
liary tree with unremarkable gallbladder.  The patient was then cleared for discharge and was sent ho
me in a stable condition.



Activity:  Fall precautions.



Diet:  Heart healthy.



Followup:  Follow up with primary care physician in 2-3 days.  Follow up with neurologist, Dr. Kowalski 
in 2 weeks.  Follow up with Cardiology, Dr. Garvin in 1 week.  Return to ER for worsening condition.




Medications:  As per medication reconciliation list.



Physical Examination:

General:  Awake, alert, oriented x3.  No acute distress, elderly male. 

CV:  S1, S2.  No murmurs.  Peripheral pulses present. 

Respiratory:  Moving air well bilaterally.  No wheezing. 

Gastrointestinal:  Abdomen is soft, nontender, and nondistended.  Positive bowel sounds. 

Extremities:  No clubbing, cyanosis, or edema. 

Neurologic:  Nonfocal.





SA/MODL

DD:  04/30/2018 15:03:29Voice ID:  [id number]

DT:  05/01/2018 13:37:02Report ID:  [id number]